# Patient Record
Sex: MALE | Race: BLACK OR AFRICAN AMERICAN | NOT HISPANIC OR LATINO | Employment: FULL TIME | URBAN - METROPOLITAN AREA
[De-identification: names, ages, dates, MRNs, and addresses within clinical notes are randomized per-mention and may not be internally consistent; named-entity substitution may affect disease eponyms.]

---

## 2018-02-08 ENCOUNTER — CLINICAL SUPPORT (OUTPATIENT)
Dept: SMOKING CESSATION | Facility: CLINIC | Age: 58
End: 2018-02-08
Payer: COMMERCIAL

## 2018-02-08 DIAGNOSIS — F17.200 NICOTINE DEPENDENCE: Primary | ICD-10-CM

## 2018-02-08 PROCEDURE — 99406 BEHAV CHNG SMOKING 3-10 MIN: CPT | Mod: S$GLB,,,

## 2018-02-10 ENCOUNTER — HOSPITAL ENCOUNTER (EMERGENCY)
Facility: OTHER | Age: 58
Discharge: HOME | End: 2018-02-10
Attending: EMERGENCY MEDICINE
Payer: COMMERCIAL

## 2018-02-10 VITALS
HEIGHT: 73 IN | WEIGHT: 224 LBS | OXYGEN SATURATION: 99 % | BODY MASS INDEX: 29.69 KG/M2 | DIASTOLIC BLOOD PRESSURE: 80 MMHG | RESPIRATION RATE: 16 BRPM | HEART RATE: 88 BPM | TEMPERATURE: 98 F | SYSTOLIC BLOOD PRESSURE: 135 MMHG

## 2018-02-10 DIAGNOSIS — R33.9 URINARY RETENTION: Primary | ICD-10-CM

## 2018-02-10 LAB
BILIRUB UR QL STRIP: NEGATIVE
CLARITY UR: CLEAR
COLOR UR: YELLOW
GLUCOSE UR QL STRIP: NEGATIVE
HGB UR QL STRIP: NEGATIVE
KETONES UR QL STRIP: NEGATIVE
LEUKOCYTE ESTERASE UR QL STRIP: NEGATIVE
NITRITE UR QL STRIP: NEGATIVE
PH UR STRIP: 8 [PH] (ref 5–8)
PROT UR QL STRIP: NEGATIVE
SP GR UR STRIP: 1.01 (ref 1–1.03)
URN SPEC COLLECT METH UR: NORMAL
UROBILINOGEN UR STRIP-ACNC: NEGATIVE EU/DL

## 2018-02-10 PROCEDURE — 81003 URINALYSIS AUTO W/O SCOPE: CPT

## 2018-02-10 PROCEDURE — 99284 EMERGENCY DEPT VISIT MOD MDM: CPT

## 2018-02-10 PROCEDURE — 25000003 PHARM REV CODE 250: Performed by: EMERGENCY MEDICINE

## 2018-02-10 RX ORDER — LIDOCAINE HYDROCHLORIDE 20 MG/ML
JELLY TOPICAL
Status: COMPLETED | OUTPATIENT
Start: 2018-02-10 | End: 2018-02-10

## 2018-02-10 RX ORDER — ALPRAZOLAM 0.5 MG/1
1 TABLET ORAL
Status: COMPLETED | OUTPATIENT
Start: 2018-02-10 | End: 2018-02-10

## 2018-02-10 RX ADMIN — ALPRAZOLAM 1 MG: 0.5 TABLET ORAL at 12:02

## 2018-02-10 RX ADMIN — LIDOCAINE HYDROCHLORIDE: 20 JELLY TOPICAL at 12:02

## 2018-02-10 NOTE — ED PROVIDER NOTES
Encounter Date: 2/10/2018    SCRIBE #1 NOTE: I, Becky Weaver, am scribing for, and in the presence of, Dr. Alexander.       History     Chief Complaint   Patient presents with    Urinary Retention     urinary frequency and urgency started last night, retention started this morning. PMH of enlarged prostate. Pt took rapid flow approx 3 hours PTA     Time seen by provider: 10:13 AM    This is a 58 y.o. male who presents with complaint of difficulty urinating that began this morning. He last voided at approximately 2:00 AM.  He reports frequency and urgency last night. He notes taking antibiotics for intermittent change in color of urine. He denies fever, chills, SOB, back pain, myalgias, dysuria, urinary incontinence, or hematuria. He took Rapaflo approximately 3 hours ago, and has experienced relief of symptoms. Patient has history of enlarged prostate, and denies malignancy. He denies history of HTN, DM, or kidney infection.      The history is provided by the patient.     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 No pertinent family history.  Social History   Substance Use Topics    Smoking status: Current Every Day Smoker     Packs/day: 0.25     Years: 20.00    Smokeless tobacco: Never Used    Alcohol use 14.4 oz/week     24 Cans of beer per week     Review of Systems   Constitutional: Negative for chills and fever.   HENT: Negative for sore throat.    Respiratory: Negative for shortness of breath.     Cardiovascular: Negative for chest pain.   Gastrointestinal: Negative for abdominal pain and nausea.   Genitourinary: Positive for difficulty urinating, frequency and urgency. Negative for decreased urine volume, dysuria, enuresis and hematuria.   Musculoskeletal: Negative for back pain and myalgias.   Skin: Negative for rash.   Neurological: Negative for weakness.   Hematological: Does not bruise/bleed easily.       Physical Exam     Initial Vitals [02/10/18 0949]   BP Pulse Resp Temp SpO2   128/87 (!) 111 16 97.6 °F (36.4 °C) 96 %      MAP       100.67         Physical Exam    Nursing note and vitals reviewed.  Constitutional: He appears well-developed and well-nourished. He is not diaphoretic. No distress.   HENT:   Head: Normocephalic and atraumatic.   Eyes: Conjunctivae and EOM are normal. No scleral icterus.   Neck: Normal range of motion. Neck supple.   Cardiovascular: Normal rate and regular rhythm. Exam reveals no gallop and no friction rub.    No murmur heard.  Pulmonary/Chest: Breath sounds normal. No respiratory distress. He has no wheezes. He has no rhonchi. He has no rales.   Abdominal: Soft. Bowel sounds are normal. He exhibits no distension. There is no tenderness. There is no rebound and no guarding.   Bladder is not palpably distended.   Musculoskeletal: Normal range of motion. He exhibits no edema or tenderness.   Neurological: He is alert and oriented to person, place, and time.   Skin: Skin is warm and dry. No rash noted. No pallor.   Psychiatric: He has a normal mood and affect. His behavior is normal. Judgment and thought content normal.         ED Course   Procedures  Labs Reviewed   URINALYSIS   URINALYSIS             Medical Decision Making:   Clinical Tests:   Lab Tests: Ordered and Reviewed  ED Management:  11:50 AM - Spontaneously voided small amount. Post-void greater than 450 mL. Observed further and recurrence of distended feeling with difficulty voiding. Would prefer leg bag to  trial of in and out catheter today.            Scribe Attestation:   Scribe #1: I performed the above scribed service and the documentation accurately describes the services I performed. I attest to the accuracy of the note.    Attending Attestation:           Physician Attestation for Scribe:  Physician Attestation Statement for Scribe #1: I, Dr. Alexander, reviewed documentation, as scribed by Becky Weaver in my presence, and it is both accurate and complete.                 ED Course      History of BPH, presents with difficulty voiding since last night.  He did start to urinate while in the waiting room and feels better, however still had a post void residual greater than 400 cc and became symptomatic again during observation.  The UTI.  Because of persistent retention despite multiple trials of voiding in the emergency department to place a Schmitz catheter with leg bad.  Encouraged follow-up with urologist, Monday or Wednesday as soon as the carnival holiday allows.    Clinical Impression:     1. Urinary retention                               Dmitry Alexander II, MD  02/10/18 [id number]

## 2018-02-10 NOTE — ED NOTES
After completing triage pt ambulated to restroom and reports able to pass small amount of urine. +dsyuria

## 2019-04-24 ENCOUNTER — OFFICE VISIT (OUTPATIENT)
Dept: OTOLARYNGOLOGY | Facility: CLINIC | Age: 59
End: 2019-04-24
Payer: COMMERCIAL

## 2019-04-24 VITALS
DIASTOLIC BLOOD PRESSURE: 81 MMHG | HEART RATE: 85 BPM | WEIGHT: 228.19 LBS | BODY MASS INDEX: 30.1 KG/M2 | SYSTOLIC BLOOD PRESSURE: 117 MMHG | TEMPERATURE: 96 F

## 2019-04-24 DIAGNOSIS — J38.7 VALLECULAR CYST: Primary | ICD-10-CM

## 2019-04-24 PROCEDURE — 99204 PR OFFICE/OUTPT VISIT, NEW, LEVL IV, 45-59 MIN: ICD-10-PCS | Mod: 25,S$GLB,, | Performed by: OTOLARYNGOLOGY

## 2019-04-24 PROCEDURE — 31575 DIAGNOSTIC LARYNGOSCOPY: CPT | Mod: S$GLB,,, | Performed by: OTOLARYNGOLOGY

## 2019-04-24 PROCEDURE — 3008F PR BODY MASS INDEX (BMI) DOCUMENTED: ICD-10-PCS | Mod: CPTII,S$GLB,, | Performed by: OTOLARYNGOLOGY

## 2019-04-24 PROCEDURE — 99999 PR PBB SHADOW E&M-EST. PATIENT-LVL III: ICD-10-PCS | Mod: PBBFAC,,, | Performed by: OTOLARYNGOLOGY

## 2019-04-24 PROCEDURE — 3008F BODY MASS INDEX DOCD: CPT | Mod: CPTII,S$GLB,, | Performed by: OTOLARYNGOLOGY

## 2019-04-24 PROCEDURE — 31575 PR LARYNGOSCOPY, FLEXIBLE; DIAGNOSTIC: ICD-10-PCS | Mod: S$GLB,,, | Performed by: OTOLARYNGOLOGY

## 2019-04-24 PROCEDURE — 99204 OFFICE O/P NEW MOD 45 MIN: CPT | Mod: 25,S$GLB,, | Performed by: OTOLARYNGOLOGY

## 2019-04-24 PROCEDURE — 99999 PR PBB SHADOW E&M-EST. PATIENT-LVL III: CPT | Mod: PBBFAC,,, | Performed by: OTOLARYNGOLOGY

## 2019-04-30 NOTE — PROGRESS NOTES
"Chief Complaint   Patient presents with    Dysphagia     TREATMENT HISTORY  1) 5/9/12: DL with biopsy BOT cyst (Dr. Rao)  2) 11/8/13: DL with Coblator removal BOT cyst (Dr. Groves)  3) Transoral laser left tongue base resection, 6/4/2014 (Dr. Rao)      59 y.o. male presents with above treatment history of vallecular cyst. Here today for recent increased throat discomfort and mild dysphagia. Describes a feeling of needing extra effort to swallow "held saliva." No fevers, tolerating POs. No recent imaging studies.      Review of Systems     Constitutional: Negative for fatigue and unexpected weight change.   HENT: per HPI.  Eyes: Negative for visual disturbance.   Respiratory: Negative for shortness of breath, hemoptysis  Cardiovascular: Negative for chest pain and palpitations.   Genitourinary: Negative for dysuria and difficulty urinating.   Musculoskeletal: Negative for decreased ROM, back pain.   Skin: Negative for rash, sunburn, itching.   Neurological: Negative for dizziness and seizures.   Hematological: Negative for adenopathy. Does not bruise/bleed easily.   Psychiatric/Behavioral: Negative for agitation. The patient is not nervous/anxious.   Endocrine: Negative for rapid weight loss/weight gain, heat/cold intolerance.     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    Pt  reports that he has been smoking.  He has a 5.00 pack-year smoking history. He has never used smokeless tobacco. He reports that he drinks about 14.4 oz of alcohol per week. He reports that he does not use drugs.    Review of patient's allergies indicates:   Allergen Reactions    Sulfa (sulfonamide antibiotics) Rash        Physical Exam    Vitals:    04/24/19 1149   BP: 117/81   Pulse: 85   Temp: (!) 95.6 °F (35.3 °C)     Body mass index is 30.1 kg/m².    Physical Exam   Constitutional: He is oriented to person, place, and time. He appears well-developed and well-nourished. No distress.   HENT:   Head: Normocephalic and atraumatic.   Right Ear: Hearing, tympanic membrane, external ear and ear canal normal. Tympanic membrane mobility is normal. No middle ear effusion. No decreased hearing is noted.   Left Ear: Hearing, tympanic membrane, external ear and ear canal normal. Tympanic membrane mobility is normal.  No middle ear effusion. No decreased hearing is noted.   Nose: Mucosal edema present.   Mouth/Throat: Uvula is midline, oropharynx is clear and moist and mucous membranes are normal.   Eyes: Pupils are equal, round, and reactive to light. Conjunctivae, EOM and lids are normal. Right eye exhibits no discharge. Left eye exhibits no discharge.   Neck: Trachea normal, normal range of motion and phonation normal. Neck supple. No tracheal tenderness present. No tracheal deviation, no edema and no erythema present. No thyroid mass and no thyromegaly present.   Cardiovascular: Normal heart sounds.   Pulmonary/Chest: Breath sounds normal. No stridor.   Abdominal: Soft.   Lymphadenopathy:     He has no cervical adenopathy.   Neurological: He is alert and oriented to person, place, and time.   Skin: Skin is warm and dry. No rash noted. He is not diaphoretic. No erythema. No pallor.   Psychiatric: He has a normal mood and affect.   Nursing note and vitals  reviewed.    Procedure: Flexible laryngoscopy  In order to fully examine the upper aerodigestive tract, including the larynx, in a patient with a hyperactive gag reflex, flexible endoscopy is required.  After explaining the procedure and obtaining verbal consent, a timeout was performed with the patient's participation according to the universal protocol. Both nasal cavities were anesthetized with 4% Xylocaine spray mixed with Raji-Synephrine. The flexible laryngoscope was inserted into the nasal cavity and advanced to visualize the nasal cavity, nasopharynx, the posterior oropharynx, hypopharynx, and the endolarynx with the above findings noted. The scope was removed and the procedure terminated. The patient tolerated this procedure well without apparent complication.     Nasopharynx - the torus is clear. There are no lesions of the posterior wall.   Oropharynx - Left vallecular fullness, no rolly cyst seen. Non-obstructive. No erythema or exudate.   Hypopharynx - there are no lesions of the pyriform sinuses or postcricoid region    Larynx - there are no lesions of the supraglottic or glottic larynx. Vocal fold mobility is normal with complete closure.     Assessment     1. Vallecular cyst          Plan  In summary, Mr. Shen is a 59 year old male with history of vallecular cyst, I am not aware of his baseline exam. No evidence of infectious process on exam today, reassurance given. Will obtain CT for further evaluation. Follow up with Dr. Rao once CT complete.

## 2019-07-26 ENCOUNTER — TELEPHONE (OUTPATIENT)
Dept: HEMATOLOGY/ONCOLOGY | Facility: CLINIC | Age: 59
End: 2019-07-26

## 2019-08-02 ENCOUNTER — LAB VISIT (OUTPATIENT)
Dept: LAB | Facility: HOSPITAL | Age: 59
End: 2019-08-02
Attending: INTERNAL MEDICINE
Payer: COMMERCIAL

## 2019-08-02 ENCOUNTER — INITIAL CONSULT (OUTPATIENT)
Dept: HEMATOLOGY/ONCOLOGY | Facility: CLINIC | Age: 59
End: 2019-08-02
Payer: COMMERCIAL

## 2019-08-02 VITALS
SYSTOLIC BLOOD PRESSURE: 133 MMHG | WEIGHT: 223.75 LBS | DIASTOLIC BLOOD PRESSURE: 82 MMHG | TEMPERATURE: 98 F | BODY MASS INDEX: 28.71 KG/M2 | OXYGEN SATURATION: 98 % | HEART RATE: 69 BPM | RESPIRATION RATE: 18 BRPM | HEIGHT: 74 IN

## 2019-08-02 DIAGNOSIS — C61 PROSTATE CANCER: ICD-10-CM

## 2019-08-02 DIAGNOSIS — C61 PROSTATE CANCER: Primary | ICD-10-CM

## 2019-08-02 LAB
ALBUMIN SERPL BCP-MCNC: 4.2 G/DL (ref 3.5–5.2)
ALP SERPL-CCNC: 69 U/L (ref 55–135)
ALT SERPL W/O P-5'-P-CCNC: 33 U/L (ref 10–44)
ANION GAP SERPL CALC-SCNC: 8 MMOL/L (ref 8–16)
AST SERPL-CCNC: 36 U/L (ref 10–40)
BILIRUB SERPL-MCNC: 0.5 MG/DL (ref 0.1–1)
BUN SERPL-MCNC: 15 MG/DL (ref 6–20)
CALCIUM SERPL-MCNC: 9.9 MG/DL (ref 8.7–10.5)
CHLORIDE SERPL-SCNC: 105 MMOL/L (ref 95–110)
CO2 SERPL-SCNC: 28 MMOL/L (ref 23–29)
CREAT SERPL-MCNC: 1.3 MG/DL (ref 0.5–1.4)
ERYTHROCYTE [DISTWIDTH] IN BLOOD BY AUTOMATED COUNT: 14.4 % (ref 11.5–14.5)
EST. GFR  (AFRICAN AMERICAN): >60 ML/MIN/1.73 M^2
EST. GFR  (NON AFRICAN AMERICAN): 59.3 ML/MIN/1.73 M^2
GLUCOSE SERPL-MCNC: 83 MG/DL (ref 70–110)
HCT VFR BLD AUTO: 50.6 % (ref 40–54)
HGB BLD-MCNC: 15.6 G/DL (ref 14–18)
IMM GRANULOCYTES # BLD AUTO: 0.01 K/UL (ref 0–0.04)
MCH RBC QN AUTO: 26.4 PG (ref 27–31)
MCHC RBC AUTO-ENTMCNC: 30.8 G/DL (ref 32–36)
MCV RBC AUTO: 86 FL (ref 82–98)
NEUTROPHILS # BLD AUTO: 3.5 K/UL (ref 1.8–7.7)
PLATELET # BLD AUTO: 217 K/UL (ref 150–350)
PMV BLD AUTO: 11 FL (ref 9.2–12.9)
POTASSIUM SERPL-SCNC: 4.4 MMOL/L (ref 3.5–5.1)
PROSTATE SPECIFIC ANTIGEN, TOTAL: 23.4 NG/ML (ref 0–4)
PROT SERPL-MCNC: 7.9 G/DL (ref 6–8.4)
PSA FREE MFR SERPL: 21.71 %
PSA FREE SERPL-MCNC: 5.08 NG/ML (ref 0.01–1.5)
RBC # BLD AUTO: 5.92 M/UL (ref 4.6–6.2)
SODIUM SERPL-SCNC: 141 MMOL/L (ref 136–145)
WBC # BLD AUTO: 5.48 K/UL (ref 3.9–12.7)

## 2019-08-02 PROCEDURE — 3008F BODY MASS INDEX DOCD: CPT | Mod: CPTII,S$GLB,,

## 2019-08-02 PROCEDURE — 99999 PR PBB SHADOW E&M-EST. PATIENT-LVL III: CPT | Mod: PBBFAC,,,

## 2019-08-02 PROCEDURE — 80053 COMPREHEN METABOLIC PANEL: CPT

## 2019-08-02 PROCEDURE — 99204 PR OFFICE/OUTPT VISIT, NEW, LEVL IV, 45-59 MIN: ICD-10-PCS | Mod: S$GLB,,,

## 2019-08-02 PROCEDURE — 99204 OFFICE O/P NEW MOD 45 MIN: CPT | Mod: S$GLB,,,

## 2019-08-02 PROCEDURE — 99999 PR PBB SHADOW E&M-EST. PATIENT-LVL III: ICD-10-PCS | Mod: PBBFAC,,,

## 2019-08-02 PROCEDURE — 84154 ASSAY OF PSA FREE: CPT

## 2019-08-02 PROCEDURE — 85027 COMPLETE CBC AUTOMATED: CPT

## 2019-08-02 PROCEDURE — 3008F PR BODY MASS INDEX (BMI) DOCUMENTED: ICD-10-PCS | Mod: CPTII,S$GLB,,

## 2019-08-02 PROCEDURE — 36415 COLL VENOUS BLD VENIPUNCTURE: CPT

## 2019-08-02 NOTE — PROGRESS NOTES
PATIENT: Miguel Angel Shen  MRN: [id number]  DATE: 8/2/2019      Diagnosis:   1. Prostate cancer        Chief Complaint: Prostate Cancer      Oncologic History:   Miguel Angel Shen is a very pleasant 59 y.o. male who works as a , he does not have much past medical history except for significantly symptomatic BPH heavy alcohol use (up to 50 beers weekly) who presents to office today seeking second opinion for prostate cancer. He was first noted to have an elevated PSA 6 years ago which was around 10. This was followed overtime and he had fluctuations which necessitated 3 biopsies the first was 3 years ago, the first two biopsies were negative for malignancy. He had an MRI of the pelvis in April 2019 which did not show any discernable lesions within the prostate. He had another biopsy in June 2019 which showed Haskell 6 adenocarcinoma of the prostate in 1/6 samples. He was then referred to medical oncology for second opinion regarding treatment options going forward.       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       Past Surgical HIstory:   Past Surgical History:   Procedure Laterality Date    CYST REMOVAL      FINGER SURGERY      GLOSSECTOMY-PARTIAL Left 11/8/2013    [facility]ND [facility]    LARYNGOSCOPY N/A 6/4/2014    [facility] at [facility]LR    POLYPECTOMY      TONSILLECTOMY, ADENOIDECTOMY      TORS - TRANSORAL ROBOTIC SURGERY N/A 6/4/2014    [facility] at [facility] 2ND [facility]LR    Transoral laser tongue base resection Left 6/4/2014       Family History:   Family History   Problem Relation Age of Onset    Stroke Mother     Seizures Father     Prostate cancer Maternal Uncle     Emphysema Maternal Uncle         Social History:  reports that he has quit smoking. He has a 5.00 pack-year smoking history. He has never used smokeless tobacco. He reports that he drinks about 14.4 oz of alcohol per week. He reports that he does not use drugs.    Allergies:  Review of patient's allergies indicates:   Allergen Reactions    Sulfa (sulfonamide antibiotics) Rash       Medications:  Current Outpatient Medications   Medication Sig Dispense Refill    aminocaproic acid (AMICAR) 500 mg Tab Take 5 g by mouth every morning.      creatinine, bulk, 100 % Powd by Misc.(Non-Drug; Combo Route) route.      levofloxacin (LEVAQUIN) 500 MG tablet   0    multivitamin capsule Take 1 capsule by mouth once daily.      nicotine (NICOTROL) 10 mg Crtg Inhale 1 puff into the lungs as needed. Maximum 6 cartridges per day 168 each 0    protein Powd Take by mouth.      silodosin (RAPAFLO) 4 mg Cap Take 1 mg by mouth.      varenicline (CHANTIX STARTING MONTH BOX) 0.5 mg (11)- 1 mg (42) tablet Take as directed on Starter Pack. 1 Package 0    YOHIMBINE HCL (YOHIMBINE MISC) by Misc.(Non-Drug; Combo Route) route once daily.       No current facility-administered medications for this visit.        Review of Systems   Constitutional: Negative for activity change, appetite change, chills, diaphoresis, fatigue, fever and unexpected weight change.   Respiratory: Negative for cough and shortness of breath.    Cardiovascular: Negative for chest pain and leg swelling.   Gastrointestinal: Negative for abdominal pain, blood in stool, constipation, diarrhea, nausea and vomiting.   Genitourinary: Positive for difficulty urinating and frequency. Negative for dysuria, flank pain and hematuria.   Musculoskeletal: Negative for back pain.   Hematological: Negative for adenopathy.       ECOG Performance Status: 0   Objective:      Vitals:   Vitals:    08/02/19 0849   BP: 133/82   BP Location: Right arm   Patient Position: Sitting   BP Method: Medium (Automatic)  "  Pulse: 69   Resp: 18   Temp: 97.9 °F (36.6 °C)   TempSrc: Oral   SpO2: 98%   Weight: 101.5 kg (223 lb 12.3 oz)   Height: 6' 1.75" (1.873 m)     BMI: Body mass index is 28.93 kg/m².    Physical Exam   Constitutional: He appears well-developed and well-nourished.   HENT:   Head: Normocephalic and atraumatic.   Eyes: Conjunctivae are normal. Right eye exhibits no discharge. Left eye exhibits no discharge. No scleral icterus.   Neck: No JVD present.   Cardiovascular: Normal rate, regular rhythm, normal heart sounds and intact distal pulses. Exam reveals no gallop and no friction rub.   No murmur heard.  Pulmonary/Chest: Effort normal and breath sounds normal. No stridor. No respiratory distress. He has no wheezes. He has no rales.   Abdominal: Soft. He exhibits no distension and no mass. There is no tenderness. There is no guarding.   Musculoskeletal: He exhibits no edema.   Lymphadenopathy:     He has no cervical adenopathy.     He has no axillary adenopathy.        Right: No inguinal and no supraclavicular adenopathy present.        Left: No inguinal and no supraclavicular adenopathy present.   Nursing note and vitals reviewed.      Laboratory Data:  Lab Visit on 08/02/2019   Component Date Value Ref Range Status    WBC 08/02/2019 5.48  3.90 - 12.70 K/uL Final    RBC 08/02/2019 5.92  4.60 - 6.20 M/uL Final    Hemoglobin 08/02/2019 15.6  14.0 - 18.0 g/dL Final    Hematocrit 08/02/2019 50.6  40.0 - 54.0 % Final    Mean Corpuscular Volume 08/02/2019 86  82 - 98 fL Final    Mean Corpuscular Hemoglobin 08/02/2019 26.4* 27.0 - 31.0 pg Final    Mean Corpuscular Hemoglobin Conc 08/02/2019 30.8* 32.0 - 36.0 g/dL Final    RDW 08/02/2019 14.4  11.5 - 14.5 % Final    Platelets 08/02/2019 217  150 - 350 K/uL Final    MPV 08/02/2019 11.0  9.2 - 12.9 fL Final    Gran # (ANC) 08/02/2019 3.5  1.8 - 7.7 K/uL Final    Comment: The ANC is based on a white cell differential from an   automated cell counter. It has not been " microscopically   reviewed for the presence of abnormal cells. Clinical   correlation is required.      Immature Grans (Abs) 08/02/2019 0.01  0.00 - 0.04 K/uL Final    Comment: Mild elevation in immature granulocytes is non specific and   can be seen in a variety of conditions including stress response,   acute inflammation, trauma and pregnancy. Correlation with other   laboratory and clinical findings is essential.           Imaging:   MRI 4/1/2019    FINDINGS:      Prostate measurements:    5.5 cm CC by 5.3 cm AP by 4.4 cm transverse.    Volume = 67.2 mL.      Peripheral Zone (PZ):    No abnormal lesion.      Transition Zone (TZ):    Benign prostatic hyperplasia.        Seminal Vesicles:    No abnormal lesion.      Lymph nodes:    No pathologically enlarged lymph nodes.      Bones:    No abnormal lesion.      Urinary bladder:    Small diverticulum along the posterior-right aspect of the bladder.        IMPRESSION:     Benign prostatic hyperplasia.    PI-RADS 2.      Assessment & Plan:       1. Prostate cancer        Patient with localized prostate adenocarcinoma, mery score is 6. We discussed his options going forward including surgery with prostatectomy, radiotherapy or to continue with active surveillance. We did explain that while there might be side effects associated with local treatment like surgery or radiation, one could make a case for him to get treated given his relatively young age and potential for prostate cancer to behave aggressively in  amrerican men. We explained that there is nor role for systemic therapy in his case given localized stage of his disease. We will send a referral to radiation oncology to see if he would be a candidate for treatment. We will check basic labs today including a PSA. He can follow with us as needed.     Patient was seen and discussed with attending Dr. Ricks.      Braulio Mirza MD, PGY-4  Hematology and Oncology Fellow

## 2023-04-18 ENCOUNTER — OFFICE VISIT (OUTPATIENT)
Dept: URGENT CARE | Facility: CLINIC | Age: 63
End: 2023-04-18
Payer: COMMERCIAL

## 2023-04-18 VITALS
HEIGHT: 73 IN | TEMPERATURE: 98 F | DIASTOLIC BLOOD PRESSURE: 77 MMHG | BODY MASS INDEX: 30.48 KG/M2 | OXYGEN SATURATION: 98 % | HEART RATE: 78 BPM | WEIGHT: 230 LBS | SYSTOLIC BLOOD PRESSURE: 138 MMHG

## 2023-04-18 DIAGNOSIS — S43.402A SPRAIN OF LEFT SHOULDER, UNSPECIFIED SHOULDER SPRAIN TYPE, INITIAL ENCOUNTER: ICD-10-CM

## 2023-04-18 DIAGNOSIS — M25.512 ACUTE PAIN OF BOTH SHOULDERS: ICD-10-CM

## 2023-04-18 DIAGNOSIS — S39.012A ACUTE MYOFASCIAL STRAIN OF LUMBAR REGION, INITIAL ENCOUNTER: ICD-10-CM

## 2023-04-18 DIAGNOSIS — M25.511 ACUTE PAIN OF BOTH SHOULDERS: ICD-10-CM

## 2023-04-18 DIAGNOSIS — Z02.6 ENCOUNTER RELATED TO WORKER'S COMPENSATION CLAIM: ICD-10-CM

## 2023-04-18 DIAGNOSIS — M25.572 ACUTE LEFT ANKLE PAIN: ICD-10-CM

## 2023-04-18 DIAGNOSIS — S86.012A STRAIN OF LEFT ACHILLES TENDON, INITIAL ENCOUNTER: Primary | ICD-10-CM

## 2023-04-18 DIAGNOSIS — S43.401A SPRAIN OF RIGHT SHOULDER, UNSPECIFIED SHOULDER SPRAIN TYPE, INITIAL ENCOUNTER: ICD-10-CM
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
## 2023-04-18 PROCEDURE — 73610 X-RAY EXAM OF ANKLE: CPT | Mod: LT,S$GLB,, | Performed by: RADIOLOGY

## 2023-04-18 PROCEDURE — 99203 PR OFFICE/OUTPT VISIT, NEW, LEVL III, 30-44 MIN: ICD-10-PCS | Mod: S$GLB,,, | Performed by: PHYSICIAN ASSISTANT

## 2023-04-18 PROCEDURE — 73030 XR SHOULDER COMPLETE 2 OR MORE VIEWS LEFT: ICD-10-PCS | Mod: LT,S$GLB,, | Performed by: RADIOLOGY

## 2023-04-18 PROCEDURE — 73610 XR ANKLE COMPLETE 3 VIEW LEFT: ICD-10-PCS | Mod: LT,S$GLB,, | Performed by: RADIOLOGY

## 2023-04-18 PROCEDURE — 99203 OFFICE O/P NEW LOW 30 MIN: CPT | Mod: S$GLB,,, | Performed by: PHYSICIAN ASSISTANT

## 2023-04-18 PROCEDURE — 73030 X-RAY EXAM OF SHOULDER: CPT | Mod: LT,S$GLB,, | Performed by: RADIOLOGY

## 2023-04-18 PROCEDURE — 73030 X-RAY EXAM OF SHOULDER: CPT | Mod: RT,S$GLB,, | Performed by: RADIOLOGY

## 2023-04-18 RX ORDER — NAPROXEN 500 MG/1
500 TABLET ORAL 2 TIMES DAILY WITH MEALS
Qty: 30 TABLET | Refills: 0 | Status: SHIPPED | OUTPATIENT
Start: 2023-04-18

## 2023-04-18 NOTE — LETTER
Urgent Care - [address] [address]  Phone: [phone]  Fax: [phone]  [facility] [employer] [facility]: [email or web address]    Pt Name: Miguel Angel Shen  Injury Date: 04/18/2023   Employee ID: [id number] Date of First Treatment: 04/18/2023   Company: [employer]      Appointment Time: 09:20 AM Arrived: 9:31 AM   Provider: Rober Fuentes PA-C Time Out: 11:38 AM     Office Treatment:   1. Strain of left Achilles tendon, initial encounter    2. Encounter related to worker's compensation claim    3. Acute left ankle pain    4. Acute pain of both shoulders    5. Sprain of left shoulder, unspecified shoulder sprain type, initial encounter    6. Sprain of right shoulder, unspecified shoulder sprain type, initial encounter    7. Acute myofascial strain of lumbar region, initial encounter    8. Work related injury      Medications Ordered This Encounter   Medications    naproxen (NAPROSYN) 500 MG tablet      Patient Instructions: Apply ice 24-48 hours then apply heat/warm soaks      Restrictions: Avoid frequent bending/lifting/twisting, No lifting/pushing/pulling more than 25 lbs, Avoid climbing/kneeling/squatting     Return Appointment: 4/25/2023 at 9:30 AM    kellen
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
## 2023-04-26 ENCOUNTER — TELEPHONE (OUTPATIENT)
Dept: URGENT CARE | Facility: CLINIC | Age: 63
End: 2023-04-26
Payer: COMMERCIAL

## 2023-04-26 NOTE — TELEPHONE ENCOUNTER
Called patient and left a message and call back number regarding missed [facility] health appointment. AFG

## 2023-12-17 ENCOUNTER — HOSPITAL ENCOUNTER (EMERGENCY)
Facility: OTHER | Age: 63
Discharge: HOME | End: 2023-12-17
Attending: EMERGENCY MEDICINE
Payer: COMMERCIAL

## 2023-12-17 VITALS
RESPIRATION RATE: 18 BRPM | WEIGHT: 225 LBS | BODY MASS INDEX: 29.69 KG/M2 | OXYGEN SATURATION: 100 % | DIASTOLIC BLOOD PRESSURE: 65 MMHG | HEART RATE: 90 BPM | SYSTOLIC BLOOD PRESSURE: 140 MMHG | TEMPERATURE: 98 F

## 2023-12-17 DIAGNOSIS — L02.91 ABSCESS: Primary | ICD-10-CM

## 2023-12-17 DIAGNOSIS — L03.116 CELLULITIS OF LEFT LOWER EXTREMITY: ICD-10-CM

## 2023-12-17 PROCEDURE — 99283 EMERGENCY DEPT VISIT LOW MDM: CPT | Mod: 25

## 2023-12-17 PROCEDURE — 25000003 PHARM REV CODE 250: Performed by: EMERGENCY MEDICINE

## 2023-12-17 PROCEDURE — 10061 I&D ABSCESS COMP/MULTIPLE: CPT

## 2023-12-17 RX ORDER — LIDOCAINE HYDROCHLORIDE 10 MG/ML
5 INJECTION INFILTRATION; PERINEURAL
Status: COMPLETED | OUTPATIENT
Start: 2023-12-17 | End: 2023-12-17

## 2023-12-17 RX ORDER — DOXYCYCLINE HYCLATE 100 MG
100 TABLET ORAL
Status: COMPLETED | OUTPATIENT
Start: 2023-12-17 | End: 2023-12-17

## 2023-12-17 RX ORDER — DOXYCYCLINE 100 MG/1
100 CAPSULE ORAL 2 TIMES DAILY
Qty: 20 CAPSULE | Refills: 0 | Status: SHIPPED | OUTPATIENT
Start: 2023-12-17 | End: 2023-12-17

## 2023-12-17 RX ORDER — DOXYCYCLINE 100 MG/1
100 CAPSULE ORAL 2 TIMES DAILY
Qty: 20 CAPSULE | Refills: 0 | Status: SHIPPED | OUTPATIENT
Start: 2023-12-17 | End: 2023-12-27

## 2023-12-17 RX ADMIN — LIDOCAINE HYDROCHLORIDE 5 ML: 10 INJECTION, SOLUTION INFILTRATION; PERINEURAL at 09:12

## 2023-12-17 RX ADMIN — DOXYCYCLINE HYCLATE 100 MG: 100 TABLET, COATED ORAL at 09:12

## 2023-12-17 NOTE — DISCHARGE INSTRUCTIONS
Mr. Shen,    Thank you for letting me care for you today! It was nice meeting you, and I hope you feel better soon.   If you would like access to your chart and what was done today please utilize the [facility] [place].   Please come back to [facility] for all of your future medical needs.    Our goal in the emergency department is to always give you outstanding care and exceptional service. You may receive a survey by mail or e-mail in the next week regarding your experience in our ED. We would greatly appreciate you completing and returning the survey. Your feedback provides us with a way to recognize our staff who give very good care and it helps us learn how to improve when your experience was below our aspiration of excellence.     Sincerely,    Garret Varela MD  Board Certified Emergency Physician

## 2023-12-17 NOTE — ED PROVIDER NOTES
Encounter Date: 12/17/2023       History     Chief Complaint   Patient presents with    Abscess     Reports abscess on right leg, onset Thursday. +swelling, +redness. Pt able to ambulate in triage.      This 63-year-old man with a past medical history significant for prostate cancer presenting for evaluation of pain and swelling in the right leg which developed over last 2 days began to drain some pus today prompting him to come in the emergency department for further evaluation.  Denies any fevers, chills, injuries elsewhere.  Nothing in particular seems to make the symptoms any better in the seem to be progressively worsening with time.      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     Past Surgical History:   Procedure Laterality Date    CYST REMOVAL      FINGER SURGERY      POLYPECTOMY      TONSILLECTOMY, ADENOIDECTOMY      Transoral laser tongue base resection Left 6/4/2014     Family History   Problem Relation Age of Onset    Stroke Mother     Seizures Father     Prostate cancer Maternal Uncle     Emphysema Maternal Uncle      Social History     Tobacco Use    Smoking status: Former     Current packs/day: 0.25     Average packs/day: 0.3 packs/day for 20.0 years (5.0 ttl pk-yrs)     Types: Cigarettes    Smokeless tobacco: Never   Substance Use Topics    Alcohol use: Yes     Alcohol/week: 24.0 standard drinks of alcohol     Types: 24 Cans of beer per week    Drug use: No     Review of Systems  Constitutional-no fever  HEENT-no congestion  Eyes-no redness  Respiratory-no shortness of breath  Cardio-no chest pain  GI-no abdominal pain  Endocrine-no cold intolerance  -no difficulty urinating  MSK-positive pain in  the leg  Skin-no rashes  Allergy-no environmental allergy  Neurologic-, no headache  Hematology-no swollen nodes  Behavioral-no confusion  Physical Exam     Initial Vitals [12/17/23 0841]   BP Pulse Resp Temp SpO2   (!) 142/64 94 18 97.5 °F (36.4 °C) 99 %      MAP       --         Physical Exam    Musculoskeletal:        Legs:            ED Course   I & D - Incision and Drainage    Date/Time: 12/17/2023 9:01 AM  Location procedure was performed: [facility] EMERGENCY DEPARTMENT    Performed by: Garret Varela MD  Authorized by: Garret Varela MD  Consent Done: Emergent Situation  Type: abscess  Body area: lower extremity  Location details: right leg  Anesthesia: local infiltration    Anesthesia:  Local Anesthetic: lidocaine 1% without epinephrine  Anesthetic total: 5 mL    Patient sedated: no  Risk factor: underlying major nerve  Scalpel size: 11  Incision type: single straight  Incision depth: dermal  Complexity: complex  Drainage: pus, bloody and purulent  Drainage amount: moderate  Wound treatment: incision, wound left open, deloculation and expression of material  Complications: No  Estimated blood loss (mL): 4  Specimens: No  Implants: No    Incision depth: dermal        Labs Reviewed - No data to display       Imaging Results    None          Medications   LIDOcaine HCL 10 mg/ml (1%) injection 5 mL (5 mLs Infiltration Given by Provider 12/17/23 0918)   doxycycline tablet 100 mg (100 mg Oral Given 12/17/23 0918)     Medical Decision Making  Differential diagnosis-cellulitis, abscess, cyst, retained foreign body    Bedside ultrasound demonstrates what appears to be fluctuant 1 x 1 cm collection of pus.    Performed incision and drainage of the bedside, antibiotics administered for surrounding cellulitis.    Will plan for encouraged returning case of any worsening.    Problems Addressed:  Abscess: complicated acute illness or injury  Cellulitis of left lower extremity: acute illness or injury    Amount  and/or Complexity of Data Reviewed  External Data Reviewed: labs and notes.     Details: history of prostate cancer    Risk  OTC drugs.  Prescription drug management.  Minor surgery with no identified risk factors.                                      Clinical Impression:  Final diagnoses:  [L02.91] Abscess (Primary)  [L03.116] Cellulitis of left lower extremity          ED Disposition Condition    Discharge Stable          ED Prescriptions       Medication Sig Dispense Start Date End Date Auth. Provider    doxycycline (VIBRAMYCIN) 100 MG Cap  (Status: Discontinued) Take 1 capsule (100 mg total) by mouth 2 (two) times daily. for 10 days 20 capsule 12/17/2023 12/17/2023 Garret Varela MD    doxycycline (VIBRAMYCIN) 100 MG Cap Take 1 capsule (100 mg total) by mouth 2 (two) times daily. for 10 days 20 capsule 12/17/2023 12/27/2023 Yaz Barreto PA-C          Follow-up Information       Follow up With Specialties Details Why Contact Info    Delfin Vivar MD Family Medicine Call in 1 day If symptoms worsen, For a follow up visit about today [address] [address]  [phone]               Garret Varela MD  12/17/23 8570

## 2025-02-24 NOTE — NURSING
Nurse Navigator Note:     Spoke with patient discussed my role as his Nurse Navigator to help provide assistance with care coordination. Patient was given my direct contact information and encouraged to call me if he has any questions or concerns.. He verbalized understanding    Oncology Navigation   Intake  Date of Diagnosis: 19  Cancer Type:  (Prostate CA)  MD Assigned: Yuki  Internal / External Referral: External  Initial Nurse Navigator Contact: 19  Diagnosis to Initial Contact Timeline (days): 36 days  Contact Method: Fax  Date Worked: 19  First Appointment Available: 19  Appointment Date: 19  Schedule to Appointment Timeline (days): 7  First Available Date vs. Scheduled Date (days): 4  Reason if booked > 7 days after scheduling: Patient request     Treatment  Current Status: Staging work-up              Acuity  Treatment Tolerability: Has not started treatment yet/treatment fully completed and side effects resolved  ECO  Comorbidities in Medical History: 1  Hospitalization Within the Past Month: 0   Needed: 0  Support: 0  Verbalizes Financial Concerns: 0  Transportation: 0  History of noncompliance/frequent no shows and cancellations: 0  Verbalizes the need for more education: 0  Navigation Acuity: 1     Follow Up  No follow-ups on file.      Awake